# Patient Record
Sex: FEMALE | Race: WHITE | NOT HISPANIC OR LATINO | URBAN - METROPOLITAN AREA
[De-identification: names, ages, dates, MRNs, and addresses within clinical notes are randomized per-mention and may not be internally consistent; named-entity substitution may affect disease eponyms.]

---

## 2024-08-13 ENCOUNTER — APPOINTMENT (OUTPATIENT)
Dept: OBSTETRICS AND GYNECOLOGY | Facility: CLINIC | Age: 24
End: 2024-08-13
Payer: COMMERCIAL

## 2025-04-14 ENCOUNTER — APPOINTMENT (OUTPATIENT)
Dept: OBSTETRICS AND GYNECOLOGY | Facility: CLINIC | Age: 25
End: 2025-04-14
Payer: COMMERCIAL

## 2025-04-14 VITALS
SYSTOLIC BLOOD PRESSURE: 102 MMHG | DIASTOLIC BLOOD PRESSURE: 54 MMHG | BODY MASS INDEX: 19.26 KG/M2 | HEIGHT: 61 IN | WEIGHT: 102 LBS

## 2025-04-14 DIAGNOSIS — Z01.419 WELL WOMAN EXAM: Primary | ICD-10-CM

## 2025-04-14 PROCEDURE — 3008F BODY MASS INDEX DOCD: CPT | Performed by: OBSTETRICS & GYNECOLOGY

## 2025-04-14 PROCEDURE — 1036F TOBACCO NON-USER: CPT | Performed by: OBSTETRICS & GYNECOLOGY

## 2025-04-14 PROCEDURE — 99385 PREV VISIT NEW AGE 18-39: CPT | Performed by: OBSTETRICS & GYNECOLOGY

## 2025-04-14 RX ORDER — SERTRALINE HYDROCHLORIDE 50 MG/1
1 TABLET, FILM COATED ORAL
COMMUNITY
Start: 2025-02-26

## 2025-04-14 NOTE — PROGRESS NOTES
Subjective   Patient ID: Beth Ortiz is a 24 y.o. female who presents for annual.  Patient for well woman exam.  24 years old.  Last seen in 2021.  Boyfriend.  Never been sexually active.  Meds include Zoloft.  Non-smoker.  Cycles regular.    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    Rose Marie history her mother diagnosed with breast cancer at age 59.  We reviewed Pap smear guidelines.  She is never been sexually active I do not think it makes sense to test that she has never been exposed to HPV.    On exam breast abdominal pelvic exams normal.  Pelvic exam was mainly an external exam.  Recommend mammography at age 38.  Follow-up 1 year        Review of Systems   All other systems reviewed and are negative.      Objective   Physical Exam  Constitutional:       Appearance: Normal appearance. She is normal weight.   Neurological:      Mental Status: She is alert.         Assessment/Plan   Well woman exam.  Patient has never been sexually active.  Follow-up 1 year.         Stalin Ardon MD 04/14/25 2:30 PM